# Patient Record
Sex: MALE | Race: BLACK OR AFRICAN AMERICAN
[De-identification: names, ages, dates, MRNs, and addresses within clinical notes are randomized per-mention and may not be internally consistent; named-entity substitution may affect disease eponyms.]

---

## 2017-10-09 ENCOUNTER — HOSPITAL ENCOUNTER (EMERGENCY)
Dept: HOSPITAL 26 - MED | Age: 27
Discharge: HOME | End: 2017-10-09
Payer: MEDICAID

## 2017-10-09 VITALS — DIASTOLIC BLOOD PRESSURE: 70 MMHG | SYSTOLIC BLOOD PRESSURE: 123 MMHG

## 2017-10-09 VITALS — HEIGHT: 71 IN | BODY MASS INDEX: 34.3 KG/M2 | WEIGHT: 245 LBS

## 2017-10-09 VITALS — SYSTOLIC BLOOD PRESSURE: 147 MMHG | DIASTOLIC BLOOD PRESSURE: 59 MMHG

## 2017-10-09 DIAGNOSIS — R03.0: ICD-10-CM

## 2017-10-09 DIAGNOSIS — J45.909: Primary | ICD-10-CM

## 2017-10-09 DIAGNOSIS — J06.9: ICD-10-CM

## 2017-10-09 PROCEDURE — 99283 EMERGENCY DEPT VISIT LOW MDM: CPT

## 2017-10-09 NOTE — NUR
Patient discharged with v/s stable. Written and verbal after care instructions 
given and explained. 

Patient alert, oriented and verbalized understanding of instructions. 
Ambulatory with steady gait. All questions addressed prior to discharge. ID 
band removed. Patient advised to follow up with PMD. Rx of PREDNISONE 20 MG, 
PROMETHAZINE 6.25 MG/5ML given. Patient educated on indication of medication 
including possible reaction and side effects. Opportunity to ask questions 
provided and answered.

## 2017-10-09 NOTE — NUR
26Y/M PT. PRESENTS TO ED WITH C/O COUGH X3 DAYS. PT. STATES COUGH WITH FEVER, 
HX. ASTHMA. AAO X4, AMBULATORY WITH STEADY GAIT. RESPIRATION SROOM AIR, EVEN 
AND UNLABORED, BL LUNGS CLEAR. C/O PRODUCTIVE COUGH. NO S/SX OF DISTRESS AT 
THIS TIME. VSS, ER MD MADE AWARE OF PT. STATUS.

## 2018-04-02 ENCOUNTER — HOSPITAL ENCOUNTER (EMERGENCY)
Dept: HOSPITAL 26 - MED | Age: 28
Discharge: HOME | End: 2018-04-02
Payer: MEDICAID

## 2018-04-02 VITALS — WEIGHT: 250 LBS | BODY MASS INDEX: 35 KG/M2 | HEIGHT: 71 IN

## 2018-04-02 VITALS — DIASTOLIC BLOOD PRESSURE: 72 MMHG | SYSTOLIC BLOOD PRESSURE: 118 MMHG

## 2018-04-02 VITALS — SYSTOLIC BLOOD PRESSURE: 116 MMHG | DIASTOLIC BLOOD PRESSURE: 70 MMHG

## 2018-04-02 DIAGNOSIS — Z79.899: ICD-10-CM

## 2018-04-02 DIAGNOSIS — J45.909: Primary | ICD-10-CM

## 2018-04-02 PROCEDURE — 99283 EMERGENCY DEPT VISIT LOW MDM: CPT

## 2018-04-02 PROCEDURE — 94640 AIRWAY INHALATION TREATMENT: CPT

## 2018-04-02 NOTE — NUR
27m bib self with c/o mild sob with cough congestion. Clear speech. Able to 
talk in full sentences. No acute resp distress noted. RR are tachynpenic and 
unlabored. Pulse ox=99%. Pt is aox4 with steady gait. Skin is warm/dry/color 
appriopriate for ethnicity. NO acute distress. All need met at this time. Will 
continue to monitor.

## 2018-04-02 NOTE — NUR
Patient discharged with v/s stable. Written and verbal after care instructions 
given and explained. 

Patient alert, oriented and verbalized understanding of instructions. 
Ambulatory with steady gait. All questions addressed prior to discharge. ID 
band removed. Patient advised to follow up with PMD. Rx of Prednsione, 
Albuterol Sulfate, Minielite standard compressor nebulizer system, and 
azithromycin given. Patient educated on indication of medication including 
possible reaction and side effects. Opportunity to ask questions provided and 
answered.

## 2019-12-04 ENCOUNTER — HOSPITAL ENCOUNTER (EMERGENCY)
Dept: HOSPITAL 26 - MED | Age: 29
Discharge: HOME | End: 2019-12-04
Payer: MEDICAID

## 2019-12-04 VITALS — BODY MASS INDEX: 33.86 KG/M2 | HEIGHT: 72 IN | WEIGHT: 250 LBS

## 2019-12-04 VITALS — DIASTOLIC BLOOD PRESSURE: 64 MMHG | SYSTOLIC BLOOD PRESSURE: 137 MMHG

## 2019-12-04 DIAGNOSIS — S89.91XA: Primary | ICD-10-CM

## 2019-12-04 DIAGNOSIS — Z79.899: ICD-10-CM

## 2019-12-04 DIAGNOSIS — J45.909: ICD-10-CM
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
## 2019-12-04 NOTE — NUR
29/M BIB SELF W/ C/O RT KNEE PAIN AFTER WORKING OUT YESTERDAY. PAIN WORSE TODAY 
8/10 ON AMBULATION. DENIES INJURY, DENIES TWISTING MOTION WHILE WORKING OUT, 
DENIES SWELLING. TOOK TYLENOL LAST NIGHT WHICH HELPED BUT WOKE UP THIS AM WITH 
WORSENING THROBBING PAIN. AMB EVEN STEADY GAIT. 

NO OBVIOUS DEFORMITY. 

VSS; BEDRAILS UP X1; ERMD TO EVALUATE. 



HX: ASTHMA

## 2019-12-04 NOTE — NUR
Patient discharged with v/s stable. Written and verbal after care instructions 
given and explained. 

Patient alert, oriented and verbalized understanding of instructions. 
Ambulatory with CRUTCHES. All questions addressed prior to discharge. ID band 
removed. Patient advised to follow up with PMD. Rx of NAPROSYN given. Patient 
educated on indication of medication including possible reaction and side 
effects. Opportunity to ask questions provided and answered.

## 2022-11-14 ENCOUNTER — HOSPITAL ENCOUNTER (EMERGENCY)
Dept: HOSPITAL 26 - MED | Age: 32
Discharge: HOME | End: 2022-11-14
Payer: MEDICAID

## 2022-11-14 VITALS — SYSTOLIC BLOOD PRESSURE: 156 MMHG | DIASTOLIC BLOOD PRESSURE: 86 MMHG

## 2022-11-14 VITALS — WEIGHT: 275 LBS | BODY MASS INDEX: 38.5 KG/M2 | HEIGHT: 71 IN

## 2022-11-14 DIAGNOSIS — J45.909: ICD-10-CM
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
## 2022-11-14 DIAGNOSIS — Z79.899: ICD-10-CM

## 2022-11-14 DIAGNOSIS — S86.812A: Primary | ICD-10-CM
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
## 2022-11-14 NOTE — NUR
30 Y/O MALE C/O LEFT KNEE PAIN S/P BASKETBALL INJURY X3DAYS, PAIN ON AREA. 
DENIES PAIN IN OTHER PARTS OF THE EXTREMITY, PT IS ABLE TO AMBULATE WITH PAIN. 



pmh: asthma

nka

med: denies